# Patient Record
Sex: MALE | Race: OTHER | URBAN - METROPOLITAN AREA
[De-identification: names, ages, dates, MRNs, and addresses within clinical notes are randomized per-mention and may not be internally consistent; named-entity substitution may affect disease eponyms.]

---

## 2017-06-01 ENCOUNTER — APPOINTMENT (RX ONLY)
Dept: URBAN - METROPOLITAN AREA CLINIC 38 | Facility: CLINIC | Age: 67
Setting detail: DERMATOLOGY
End: 2017-06-01

## 2017-06-01 DIAGNOSIS — L82.1 OTHER SEBORRHEIC KERATOSIS: ICD-10-CM

## 2017-06-01 DIAGNOSIS — L57.0 ACTINIC KERATOSIS: ICD-10-CM

## 2017-06-01 DIAGNOSIS — D22 MELANOCYTIC NEVI: ICD-10-CM

## 2017-06-01 DIAGNOSIS — L91.8 OTHER HYPERTROPHIC DISORDERS OF THE SKIN: ICD-10-CM

## 2017-06-01 PROBLEM — D22.9 MELANOCYTIC NEVI, UNSPECIFIED: Status: ACTIVE | Noted: 2017-06-01

## 2017-06-01 PROBLEM — M12.9 ARTHROPATHY, UNSPECIFIED: Status: ACTIVE | Noted: 2017-06-01

## 2017-06-01 PROCEDURE — ? LIQUID NITROGEN

## 2017-06-01 PROCEDURE — 17003 DESTRUCT PREMALG LES 2-14: CPT

## 2017-06-01 PROCEDURE — ? COUNSELING

## 2017-06-01 PROCEDURE — 99213 OFFICE O/P EST LOW 20 MIN: CPT | Mod: 25

## 2017-06-01 PROCEDURE — 17000 DESTRUCT PREMALG LESION: CPT

## 2017-06-01 ASSESSMENT — LOCATION SIMPLE DESCRIPTION DERM
LOCATION SIMPLE: RIGHT FOREHEAD
LOCATION SIMPLE: LEFT AXILLARY VAULT
LOCATION SIMPLE: LEFT ANTERIOR NECK
LOCATION SIMPLE: RIGHT SCALP

## 2017-06-01 ASSESSMENT — LOCATION DETAILED DESCRIPTION DERM
LOCATION DETAILED: RIGHT CENTRAL FRONTAL SCALP
LOCATION DETAILED: RIGHT SUPERIOR FOREHEAD
LOCATION DETAILED: LEFT CLAVICULAR NECK
LOCATION DETAILED: LEFT AXILLARY VAULT

## 2017-06-01 ASSESSMENT — LOCATION ZONE DERM
LOCATION ZONE: FACE
LOCATION ZONE: NECK
LOCATION ZONE: SCALP
LOCATION ZONE: AXILLAE

## 2017-06-01 NOTE — PROCEDURE: LIQUID NITROGEN
Duration Of Freeze Thaw-Cycle (Seconds): 7
Post-Care Instructions: I reviewed with the patient in detail post-care instructions. Patient is to wear sunprotection, and avoid picking at any of the treated lesions. Pt may apply Vaseline to crusted or scabbing areas, which may quicken healing and reduce scarring.
Detail Level: Detailed
Render Post-Care Instructions In Note?: no
Consent: The patient's consent was obtained including but not limited to risks of crusting, scabbing, blistering, scarring, darker or lighter pigmentary change, recurrence, incomplete removal and infection.

## 2017-07-05 ENCOUNTER — IMPORTED ENCOUNTER (OUTPATIENT)
Dept: URBAN - METROPOLITAN AREA CLINIC 9 | Facility: CLINIC | Age: 67
End: 2017-07-05

## 2017-07-31 ENCOUNTER — IMPORTED ENCOUNTER (OUTPATIENT)
Dept: URBAN - METROPOLITAN AREA CLINIC 9 | Facility: CLINIC | Age: 67
End: 2017-07-31

## 2017-12-20 ENCOUNTER — IMPORTED ENCOUNTER (OUTPATIENT)
Dept: URBAN - METROPOLITAN AREA CLINIC 9 | Facility: CLINIC | Age: 67
End: 2017-12-20

## 2017-12-28 ENCOUNTER — IMPORTED ENCOUNTER (OUTPATIENT)
Dept: URBAN - METROPOLITAN AREA CLINIC 9 | Facility: CLINIC | Age: 67
End: 2017-12-28

## 2018-04-11 ENCOUNTER — IMPORTED ENCOUNTER (OUTPATIENT)
Dept: URBAN - METROPOLITAN AREA CLINIC 9 | Facility: CLINIC | Age: 68
End: 2018-04-11

## 2018-04-18 ENCOUNTER — IMPORTED ENCOUNTER (OUTPATIENT)
Dept: URBAN - METROPOLITAN AREA CLINIC 9 | Facility: CLINIC | Age: 68
End: 2018-04-18

## 2018-05-02 ENCOUNTER — IMPORTED ENCOUNTER (OUTPATIENT)
Dept: URBAN - METROPOLITAN AREA CLINIC 9 | Facility: CLINIC | Age: 68
End: 2018-05-02

## 2018-07-02 ENCOUNTER — IMPORTED ENCOUNTER (OUTPATIENT)
Dept: URBAN - METROPOLITAN AREA CLINIC 9 | Facility: CLINIC | Age: 68
End: 2018-07-02

## 2018-08-17 ENCOUNTER — IMPORTED ENCOUNTER (OUTPATIENT)
Dept: URBAN - METROPOLITAN AREA CLINIC 9 | Facility: CLINIC | Age: 68
End: 2018-08-17

## 2018-11-21 ENCOUNTER — IMPORTED ENCOUNTER (OUTPATIENT)
Dept: URBAN - METROPOLITAN AREA CLINIC 9 | Facility: CLINIC | Age: 68
End: 2018-11-21

## 2019-05-06 ENCOUNTER — IMPORTED ENCOUNTER (OUTPATIENT)
Dept: URBAN - METROPOLITAN AREA CLINIC 9 | Facility: CLINIC | Age: 69
End: 2019-05-06

## 2019-11-13 ENCOUNTER — IMPORTED ENCOUNTER (OUTPATIENT)
Dept: URBAN - METROPOLITAN AREA CLINIC 9 | Facility: CLINIC | Age: 69
End: 2019-11-13

## 2019-11-13 PROBLEM — Z96.1: Noted: 2019-11-13

## 2019-11-13 PROBLEM — Z96.1: Noted: 2019-11-13 | Resolved: 2019-11-13

## 2019-11-13 PROBLEM — H35.373: Noted: 2019-11-13

## 2019-11-13 PROBLEM — H43.811: Noted: 2019-11-13 | Resolved: 2019-11-13

## 2019-11-13 PROBLEM — H43.813: Noted: 2019-11-13

## 2019-11-13 PROBLEM — H35.373: Noted: 2019-11-13 | Resolved: 2019-11-13

## 2019-11-13 PROBLEM — H26.492: Noted: 2019-11-13

## 2021-10-16 ASSESSMENT — TONOMETRY
OS_IOP_MMHG: 17
OD_IOP_MMHG: 13
OS_IOP_MMHG: 13
OS_IOP_MMHG: 13
OS_IOP_MMHG: 16
OD_IOP_MMHG: 14
OD_IOP_MMHG: 9
OS_IOP_MMHG: 9
OS_IOP_MMHG: 15
OD_IOP_MMHG: 13
OS_IOP_MMHG: 15
OD_IOP_MMHG: 27
OS_IOP_MMHG: 14
OD_IOP_MMHG: 18
OD_IOP_MMHG: 19
OD_IOP_MMHG: 18
OD_IOP_MMHG: 18
OD_IOP_MMHG: 11
OS_IOP_MMHG: 12
OD_IOP_MMHG: 14

## 2021-10-16 ASSESSMENT — VISUAL ACUITY
OD_CC: 20/40 -2 SN
OD_CC: 20/20 SN
OS_CC: 20/40 - SN
OD_CC: 20/40 -2 SN
OS_CC: 20/25 - SN
OS_CC: 20/30 - SN
OD_CC: 20/25 - SN
OS_CC: 20/25 -2 SN
OD_SC: 20/50 + SN
OS_SC: 20/40 + SN
OS_CC: 20/25 - SN
OD_CC: 20/40 -2 SN
OS_SC: 20/100 SN
OD_CC: 20/40 - SN
OS_SC: 20/200 +2 SN
OS_CC: 20/40 - SN
OS_CC: 20/25 SN
OS_CC: 20/20 - SN
OD_SC: CF 4' LV
OD_CC: 20/30 + SN
OS_CC: 20/40 SN
OD_SC: 20/200 SN
OD_CC: 20/40 + SN
OD_CC: 20/30 - SN
OD_CC: 20/30 SN
OS_CC: 20/25 SN
OS_PH: 20/40 SN
OS_SC: 20/100 -2 SN
OD_CC: 20/70 - SN
OS_SC: 20/100 + SN
OD_SC: 20/200 - SN
OS_CC: 20/20 -2 SN
OS_SC: CF 4' LV
OD_SC: 20/200 SN
OD_CC: 20/30 - SN
OS_SC: 20/100 + SN
OD_CC: 20/30 - SN
OD_SC: 20/400 SN
OS_SC: 20/400 SN
OD_SC: 20/200 + SN
OD_SC: 20/200 SN
OS_CC: 20/60 - SN
OD_CC: 20/40 + SN
OS_CC: 20/30 SN
OD_SC: CF 2FT SN
OD_PH: 20/40 SN

## 2021-10-16 ASSESSMENT — KERATOMETRY
OS_K1POWER_DIOPTERS: 43
OS_AXISANGLE_DEGREES: 74
OD_AXISANGLE_DEGREES: 180
OS_AXISANGLE_DEGREES: 70
OD_AXISANGLE2_DEGREES: 90
OD_AXISANGLE2_DEGREES: 1
OD_K2POWER_DIOPTERS: 43.75
OD_AXISANGLE2_DEGREES: 12
OD_K1POWER_DIOPTERS: 43
OS_K1POWER_DIOPTERS: 43
OS_AXISANGLE2_DEGREES: 160
OS_AXISANGLE2_DEGREES: 164
OS_AXISANGLE_DEGREES: 78
OD_AXISANGLE2_DEGREES: 15
OD_K1POWER_DIOPTERS: 43.25
OD_AXISANGLE_DEGREES: 102
OS_K2POWER_DIOPTERS: 44
OD_AXISANGLE2_DEGREES: 1
OD_AXISANGLE_DEGREES: 180
OD_K1POWER_DIOPTERS: 43.5
OD_K1POWER_DIOPTERS: 43
OS_K2POWER_DIOPTERS: 43.5
OS_AXISANGLE_DEGREES: 79
OS_K1POWER_DIOPTERS: 43
OD_AXISANGLE_DEGREES: 91
OD_AXISANGLE2_DEGREES: 90
OD_K1POWER_DIOPTERS: 42.5
OD_K2POWER_DIOPTERS: 43.5
OS_K1POWER_DIOPTERS: 42.75
OD_K2POWER_DIOPTERS: 43.5
OS_K2POWER_DIOPTERS: 44
OS_K2POWER_DIOPTERS: 44
OD_AXISANGLE_DEGREES: 91
OS_AXISANGLE2_DEGREES: 168
OS_AXISANGLE2_DEGREES: 169
OD_K1POWER_DIOPTERS: 42.75
OS_AXISANGLE2_DEGREES: 168
OD_AXISANGLE_DEGREES: 105
OS_K1POWER_DIOPTERS: 42.75
OD_K2POWER_DIOPTERS: 43.25
OS_AXISANGLE_DEGREES: 78
OD_K2POWER_DIOPTERS: 43.5
OD_K2POWER_DIOPTERS: 43.5
OS_K2POWER_DIOPTERS: 44

## 2025-08-07 ENCOUNTER — NEW PATIENT (OUTPATIENT)
Age: 75
End: 2025-08-07

## 2025-08-07 DIAGNOSIS — H43.313: ICD-10-CM

## 2025-08-07 DIAGNOSIS — H35.373: ICD-10-CM

## 2025-08-07 DIAGNOSIS — Z96.1: ICD-10-CM

## 2025-08-07 DIAGNOSIS — H26.492: ICD-10-CM

## 2025-08-07 DIAGNOSIS — H04.123: ICD-10-CM

## 2025-08-07 DIAGNOSIS — H35.433: ICD-10-CM

## 2025-08-07 DIAGNOSIS — H35.3132: ICD-10-CM

## 2025-08-07 DIAGNOSIS — H52.13: ICD-10-CM

## 2025-08-07 DIAGNOSIS — H43.813: ICD-10-CM

## 2025-08-07 PROCEDURE — 99204 OFFICE O/P NEW MOD 45 MIN: CPT

## 2025-08-07 PROCEDURE — 92134 CPTRZ OPH DX IMG PST SGM RTA: CPT

## 2025-08-27 ENCOUNTER — SURGERY/PROCEDURE (OUTPATIENT)
Age: 75
End: 2025-08-27

## 2025-08-27 DIAGNOSIS — H26.492: ICD-10-CM

## 2025-08-27 DIAGNOSIS — H43.313: ICD-10-CM

## 2025-08-27 PROCEDURE — 67036 REMOVAL OF INNER EYE FLUID: CPT

## 2025-08-28 ENCOUNTER — POST-OP (OUTPATIENT)
Age: 75
End: 2025-08-28

## 2025-08-28 DIAGNOSIS — H43.313: ICD-10-CM

## 2025-08-28 PROCEDURE — 99024 POSTOP FOLLOW-UP VISIT: CPT
